# Patient Record
Sex: MALE | Race: WHITE | NOT HISPANIC OR LATINO | URBAN - METROPOLITAN AREA
[De-identification: names, ages, dates, MRNs, and addresses within clinical notes are randomized per-mention and may not be internally consistent; named-entity substitution may affect disease eponyms.]

---

## 2018-11-16 RX ORDER — RABEPRAZOLE SODIUM 20 MG/1
20 TABLET, DELAYED RELEASE ORAL DAILY
COMMUNITY
End: 2020-07-15 | Stop reason: ALTCHOICE

## 2018-11-16 RX ORDER — RANITIDINE 300 MG/1
300 TABLET ORAL
COMMUNITY
End: 2020-07-15 | Stop reason: ALTCHOICE

## 2018-11-16 RX ORDER — LEVOTHYROXINE SODIUM 50 UG/1
50 CAPSULE ORAL DAILY
COMMUNITY

## 2018-11-19 NOTE — PRE-PROCEDURE INSTRUCTIONS
Pre-Surgery Instructions:   Medication Instructions    Levothyroxine Sodium 50 MCG CAPS Instructed patient per Anesthesia Guidelines  Follow Preoperative instruction physician  Wear loose comfortable, easy on/off clothing  You will receive a call with your time to arrive at the hospital     Secure transportation, you will be having anesthesia

## 2018-11-21 ENCOUNTER — ANESTHESIA EVENT (OUTPATIENT)
Dept: PERIOP | Facility: HOSPITAL | Age: 38
End: 2018-11-21
Payer: MEDICARE

## 2018-11-21 ENCOUNTER — HOSPITAL ENCOUNTER (OUTPATIENT)
Facility: HOSPITAL | Age: 38
Setting detail: OUTPATIENT SURGERY
Discharge: HOME | End: 2018-11-21
Attending: INTERNAL MEDICINE | Admitting: INTERNAL MEDICINE
Payer: MEDICARE

## 2018-11-21 ENCOUNTER — ANESTHESIA (OUTPATIENT)
Dept: PERIOP | Facility: HOSPITAL | Age: 38
End: 2018-11-21
Payer: MEDICARE

## 2018-11-21 VITALS
WEIGHT: 171.2 LBS | OXYGEN SATURATION: 93 % | RESPIRATION RATE: 16 BRPM | DIASTOLIC BLOOD PRESSURE: 74 MMHG | TEMPERATURE: 98.5 F | HEIGHT: 60 IN | SYSTOLIC BLOOD PRESSURE: 113 MMHG | BODY MASS INDEX: 33.61 KG/M2 | HEART RATE: 71 BPM

## 2018-11-21 DIAGNOSIS — R13.14 DYSPHAGIA, PHARYNGOESOPHAGEAL PHASE: ICD-10-CM

## 2018-11-21 PROCEDURE — 88305 TISSUE EXAM BY PATHOLOGIST: CPT | Performed by: PATHOLOGY

## 2018-11-21 RX ORDER — SODIUM CHLORIDE 9 MG/ML
20 INJECTION, SOLUTION INTRAVENOUS CONTINUOUS
Status: DISCONTINUED | OUTPATIENT
Start: 2018-11-21 | End: 2018-11-21 | Stop reason: HOSPADM

## 2018-11-21 RX ORDER — LABETALOL HYDROCHLORIDE 5 MG/ML
5 INJECTION, SOLUTION INTRAVENOUS AS NEEDED
Status: DISCONTINUED | OUTPATIENT
Start: 2018-11-21 | End: 2018-11-21 | Stop reason: HOSPADM

## 2018-11-21 RX ORDER — MEPERIDINE HYDROCHLORIDE 50 MG/ML
12.5 INJECTION INTRAMUSCULAR; INTRAVENOUS; SUBCUTANEOUS
Status: DISCONTINUED | OUTPATIENT
Start: 2018-11-21 | End: 2018-11-21 | Stop reason: HOSPADM

## 2018-11-21 RX ORDER — HYDRALAZINE HYDROCHLORIDE 20 MG/ML
5 INJECTION INTRAMUSCULAR; INTRAVENOUS AS NEEDED
Status: DISCONTINUED | OUTPATIENT
Start: 2018-11-21 | End: 2018-11-21 | Stop reason: HOSPADM

## 2018-11-21 RX ORDER — FENTANYL CITRATE/PF 50 MCG/ML
25 SYRINGE (ML) INJECTION
Status: DISCONTINUED | OUTPATIENT
Start: 2018-11-21 | End: 2018-11-21 | Stop reason: HOSPADM

## 2018-11-21 RX ORDER — ONDANSETRON 2 MG/ML
4 INJECTION INTRAMUSCULAR; INTRAVENOUS ONCE AS NEEDED
Status: DISCONTINUED | OUTPATIENT
Start: 2018-11-21 | End: 2018-11-21 | Stop reason: HOSPADM

## 2018-11-21 RX ORDER — METOCLOPRAMIDE HYDROCHLORIDE 5 MG/ML
10 INJECTION INTRAMUSCULAR; INTRAVENOUS ONCE AS NEEDED
Status: DISCONTINUED | OUTPATIENT
Start: 2018-11-21 | End: 2018-11-21 | Stop reason: HOSPADM

## 2018-11-21 RX ORDER — PROPOFOL 10 MG/ML
INJECTION, EMULSION INTRAVENOUS AS NEEDED
Status: DISCONTINUED | OUTPATIENT
Start: 2018-11-21 | End: 2018-11-21 | Stop reason: SURG

## 2018-11-21 RX ORDER — PROMETHAZINE HYDROCHLORIDE 25 MG/ML
6.25 INJECTION, SOLUTION INTRAMUSCULAR; INTRAVENOUS ONCE AS NEEDED
Status: DISCONTINUED | OUTPATIENT
Start: 2018-11-21 | End: 2018-11-21 | Stop reason: HOSPADM

## 2018-11-21 RX ADMIN — PROPOFOL 50 MG: 10 INJECTION, EMULSION INTRAVENOUS at 10:42

## 2018-11-21 RX ADMIN — SODIUM CHLORIDE 20 ML/HR: 0.9 INJECTION, SOLUTION INTRAVENOUS at 09:30

## 2018-11-21 RX ADMIN — PROPOFOL 20 MG: 10 INJECTION, EMULSION INTRAVENOUS at 10:44

## 2018-11-21 RX ADMIN — SODIUM CHLORIDE: 0.9 INJECTION, SOLUTION INTRAVENOUS at 10:22

## 2018-11-21 RX ADMIN — PROPOFOL 80 MG: 10 INJECTION, EMULSION INTRAVENOUS at 10:40

## 2018-11-21 NOTE — ANESTHESIA PREPROCEDURE EVALUATION
Review of Systems/Medical History  Patient summary reviewed  Chart reviewed  No history of anesthetic complications     Cardiovascular  Negative cardio ROS Exercise tolerance (METS): >4,     Pulmonary  Negative pulmonary ROS        GI/Hepatic  Dysphagia,          Negative  ROS        Endo/Other  Negative endo/other ROS History of thyroid disease , hypothyroidism,      GYN  Negative gynecology ROS          Hematology  Negative hematology ROS      Musculoskeletal  Negative musculoskeletal ROS        Neurology      Comment: Down's syndrome Psychology   Negative psychology ROS              Physical Exam    Airway    Mallampati score: III  TM Distance: >3 FB  Neck ROM: limited     Dental   No notable dental hx     Cardiovascular  Comment: Negative ROS, Rhythm: regular, Rate: normal, Cardiovascular exam normal    Pulmonary  Pulmonary exam normal Breath sounds clear to auscultation,     Other Findings        Anesthesia Plan  ASA Score- 3     Anesthesia Type- IV sedation with anesthesia with ASA Monitors  Additional Monitors:   Airway Plan:         Plan Factors-    Induction-     Postoperative Plan-     Informed Consent- Anesthetic plan and risks discussed with patient, mother and father

## 2018-11-21 NOTE — OP NOTE
ESOPHAGOGASTRODUODENOSCOPY    PROCEDURE: EGD    SEDATION: Monitored anesthesia care, check anesthesia records    ASA Class: III     INDICATIONS:  Dysphagia    CONSENT:  Informed consent was obtained for the procedure, including sedation after explaining the risks and benefits of the procedure  Risks including but not limited to bleeding, perforation, infection, and missed lesion  PREPARATION:   Telemetry, pulse oximetry, blood pressure were monitored throughout the procedure  Patient was identified by myself both verbally and by visual inspection of ID band  DESCRIPTION:   Patient was placed in the left lateral decubitus position and was sedated with the above medication  The gastroscope was introduced in to the oropharynx and the esophagus was intubated under direct visualization  Scope was passed down the esophagus up to 2nd part of the duodenum  A careful inspection was made as the gastroscope was withdrawn, including a retroflexed view of the stomach; findings and interventions are described below  FINDINGS:    #1  Esophagus- normal appearing esophagus except for mild stricture EG junction at 38 cm from the incisors  Midesophagus biopsied to rule out the eosinophilic esophagitis  EG junction biopsied for pathology  Bhandari dilatation performed using 50 and 60 Spanish dilators without difficulty  #2  Stomach- normal including retroflexed view of fundus    #3  Duodenum- normal duodenal sweep    ESTIMATED BLOOD LOSS: Minimal    SECIMENS:Pathology         IMPRESSIONS:      Dysphagia secondary to mild stricture EG junction dilated with Bhandari dilators to [address] [place]  Otherwise normal EGD    RECOMMENDATIONS:     Reflux diet and precautions  Continue pantoprazole  Office follow-up with [facility] in 1-2 months    COMPLICATIONS:  None; patient tolerated the procedure well            DISPOSITION: PACU           CONDITION: Stable

## 2018-11-21 NOTE — DISCHARGE INSTR - AVS FIRST PAGE
ESOPHAGOGASTRODUODENOSCOPY    PROCEDURE: EGD    SEDATION: Monitored anesthesia care, check anesthesia records    ASA Class: III     INDICATIONS:  Dysphagia    CONSENT:  Informed consent was obtained for the procedure, including sedation after explaining the risks and benefits of the procedure  Risks including but not limited to bleeding, perforation, infection, and missed lesion  PREPARATION:   Telemetry, pulse oximetry, blood pressure were monitored throughout the procedure  Patient was identified by myself both verbally and by visual inspection of ID band  DESCRIPTION:   Patient was placed in the left lateral decubitus position and was sedated with the above medication  The gastroscope was introduced in to the oropharynx and the esophagus was intubated under direct visualization  Scope was passed down the esophagus up to 2nd part of the duodenum  A careful inspection was made as the gastroscope was withdrawn, including a retroflexed view of the stomach; findings and interventions are described below  FINDINGS:    #1  Esophagus- normal appearing esophagus except for mild stricture EG junction at 38 cm from the incisors  Midesophagus biopsied to rule out the eosinophilic esophagitis  EG junction biopsied for pathology  Bhandari dilatation performed using 50 and 60 Thai dilators without difficulty  #2  Stomach- normal including retroflexed view of fundus    #3  Duodenum- normal duodenal sweep    ESTIMATED BLOOD LOSS: Minimal    SECIMENS:Pathology         IMPRESSIONS:      Dysphagia secondary to mild stricture EG junction dilated with Bhandari dilators to 60 [place]  Otherwise normal EGD    RECOMMENDATIONS:     Reflux diet and precautions  Continue pantoprazole  Office follow-up with [facility] in 1-2 months    COMPLICATIONS:  None; patient tolerated the procedure well            DISPOSITION: PACU           CONDITION: Stable          [facility]      DISCHARGE INSTRUCTIONS      Dear Patient,    If specimens were collected, the office will call you with the pathology results within 2 weeks  It is very important that you follow these instructions:    Because you received intravenous sedation for your exam, you must return home and   · REST TODAY - DO NOT STAY ALONE  · DO NOT operate machinery for 24 hours   · DO NOT drink alcohol for 24 hours  · DO NOT drive for 24 hours  · DO NOT return to work until tomorrow   · DO NOT sign important paperwork for 24 hours    ·  If the site where your IV was placed is painful, place warm wet  compresses on the site until the soreness is relieved  Call us if there  is no improvement within 24 hours  · Unless otherwise instructed, you may resume your regular diet,  Start by taking  crackers and clear liquids  If no nausea or vomiting, you may advance your diet as tolerated  · Call your physician at [phone] if you develop any of the following symptoms:  · NEW OR INCREASED BLEEDING  · NEW ABDOMINAL DISTENTION (SWELLING)  · INCREASING NAUSEA, VOMITING OR PAIN  · FEVER/CHILLS  · BLACK/BLOODY STOOLS    If you are unable to contact the the doctor or your primary physician and you are having a severe complication, go to the nearest emergency room or call 911  Call [facility] at [phone] if you have any problems or questions related to your procedure  NOTE: After normal office hours (Monday-Friday 9:00 a m  to 4:00 p m ), the answering service will answer the phone, take a message, and they will contact the on call physician to call you

## 2019-02-23 DIAGNOSIS — K21.9 GERD WITH STRICTURE: Primary | ICD-10-CM

## 2019-02-23 DIAGNOSIS — K22.2 GERD WITH STRICTURE: Primary | ICD-10-CM

## 2019-02-25 RX ORDER — PANTOPRAZOLE SODIUM 40 MG/1
TABLET, DELAYED RELEASE ORAL
Qty: 30 TABLET | Refills: 3 | Status: SHIPPED | OUTPATIENT
Start: 2019-02-25 | End: 2019-05-25 | Stop reason: SDUPTHER

## 2019-05-25 DIAGNOSIS — K22.2 GERD WITH STRICTURE: ICD-10-CM

## 2019-05-25 DIAGNOSIS — K21.9 GERD WITH STRICTURE: ICD-10-CM

## 2019-05-27 RX ORDER — PANTOPRAZOLE SODIUM 40 MG/1
TABLET, DELAYED RELEASE ORAL
Qty: 30 TABLET | Refills: 2 | Status: SHIPPED | OUTPATIENT
Start: 2019-05-27 | End: 2019-09-16 | Stop reason: SDUPTHER

## 2019-09-16 DIAGNOSIS — K22.2 GERD WITH STRICTURE: ICD-10-CM

## 2019-09-16 DIAGNOSIS — K21.9 GERD WITH STRICTURE: ICD-10-CM

## 2019-09-17 RX ORDER — PANTOPRAZOLE SODIUM 40 MG/1
TABLET, DELAYED RELEASE ORAL
Qty: 90 TABLET | Refills: 0 | Status: SHIPPED | OUTPATIENT
Start: 2019-09-17 | End: 2019-12-05 | Stop reason: SDUPTHER

## 2019-12-05 DIAGNOSIS — K21.9 GERD WITH STRICTURE: ICD-10-CM

## 2019-12-05 DIAGNOSIS — K22.2 GERD WITH STRICTURE: ICD-10-CM

## 2019-12-05 RX ORDER — PANTOPRAZOLE SODIUM 40 MG/1
40 TABLET, DELAYED RELEASE ORAL DAILY
Qty: 90 TABLET | Refills: 1 | Status: SHIPPED | OUTPATIENT
Start: 2019-12-05 | End: 2020-06-03

## 2019-12-05 RX ORDER — PANTOPRAZOLE SODIUM 40 MG/1
TABLET, DELAYED RELEASE ORAL
Qty: 90 TABLET | Refills: 0 | Status: SHIPPED | OUTPATIENT
Start: 2019-12-05 | End: 2020-09-25 | Stop reason: SDUPTHER

## 2019-12-05 NOTE — TELEPHONE ENCOUNTER
Pt's father states he got a letter from [employer] saying they are trying to get ahold of the office about Pantoprazole auth expiring soon    If ques CB to Dad [phone]

## 2019-12-05 NOTE — TELEPHONE ENCOUNTER
I contacted Mr  Becca Anderson and he states he received a letter stating medication needs prior authorization  I reviewed that I would contact his local [facility] where he gets filled to verify  I spoke with pharmacist and [facility] is do for refills, she ran claim and it goes through without authorization required, Please sign off to release

## 2020-06-01 DIAGNOSIS — K22.2 GERD WITH STRICTURE: ICD-10-CM

## 2020-06-01 DIAGNOSIS — K21.9 GERD WITH STRICTURE: ICD-10-CM

## 2020-06-03 RX ORDER — PANTOPRAZOLE SODIUM 40 MG/1
TABLET, DELAYED RELEASE ORAL
Qty: 90 TABLET | Refills: 1 | Status: SHIPPED | OUTPATIENT
Start: 2020-06-03 | End: 2020-09-25 | Stop reason: SDUPTHER

## 2020-07-15 ENCOUNTER — OFFICE VISIT (OUTPATIENT)
Dept: GASTROENTEROLOGY | Facility: CLINIC | Age: 40
End: 2020-07-15
Payer: MEDICARE

## 2020-07-15 VITALS
WEIGHT: 150 LBS | HEIGHT: 60 IN | SYSTOLIC BLOOD PRESSURE: 114 MMHG | DIASTOLIC BLOOD PRESSURE: 62 MMHG | TEMPERATURE: 97.2 F | BODY MASS INDEX: 29.45 KG/M2 | HEART RATE: 68 BPM

## 2020-07-15 DIAGNOSIS — Z83.71 FAMILY HISTORY OF COLONIC POLYPS: ICD-10-CM

## 2020-07-15 DIAGNOSIS — K21.9 GASTROESOPHAGEAL REFLUX DISEASE, ESOPHAGITIS PRESENCE NOT SPECIFIED: Primary | ICD-10-CM

## 2020-07-15 PROBLEM — Z83.719 FAMILY HISTORY OF COLONIC POLYPS: Status: ACTIVE | Noted: 2020-07-15

## 2020-07-15 PROCEDURE — 99213 OFFICE O/P EST LOW 20 MIN: CPT | Performed by: INTERNAL MEDICINE

## 2020-07-15 NOTE — PROGRESS NOTES
[id number] - Outpatient Follow-up Note  Farzaneh Aceves 42 y o  male MRN: [id number]  Encounter: [id number]    ASSESSMENT AND PLAN:      1  Gastroesophageal reflux disease, esophagitis presence not specified  Chronic reflux with a history of dysphagia  Endoscopy in 2013 without any evidence of Freeman's infection or eosinophilic esophagitis  Mild stricture was present at that time  Patient did have an episode last fall which resolved with drinking soda  Since then has been asymptomatic with careful eating, reflux precautions and PPI  No alarm symptoms  · Continue with reflux diet and precautions  · Cut and chew food well, take time eating  · Encourage fluids with meals  · Continue pantoprazole  · Call if symptoms change, medicine becomes ineffective, or any difficulty swallowing  If that occurs would plan repeat EGD    2  Family history of colonic polyps  Family history of polyps  Recommend screening colonoscopy beginning at age 36  Followup Appointment:  1 year or sooner if needed  ______________________________________________________________________    Chief Complaint   Patient presents with    Medication Refill     Pantoprazole 40 mg daily     HPI:  Last fall had issues with dysphagia  Felt food stuck, went to emergency room, and resolved with Coke  No chest pain, heartburn or water brash  No change in appetite or weight loss  Stools normal   No melena or heme  Now much more careful about taking time eating  Cutting and chewing food well and drinking fluids  Historical Information   Past Medical History:   Diagnosis Date    Disease of thyroid gland     Down's syndrome     Dysphagia     GERD (gastroesophageal reflux disease)     Hypotonia      Past Surgical History:   Procedure Laterality Date    HERNIA REPAIR      [place] ESOPHAGOGASTRODUODENOSCOPY TRANSORAL DIAGNOSTIC N/A 11/21/2018    Procedure: ESOPHAGOGASTRODUODENOSCOPY (EGD);   Surgeon: Lucía Martel Janeth House MD;  Location: [facility] OR;  Service: Gastroenterology    UPPER GASTROINTESTINAL ENDOSCOPY      June 2013 mild stricture EG junction  Biopsies showed inflammation but no Freeman's, EOE or neoplasm  Social History     Substance and Sexual Activity   Alcohol Use Yes     Social History     Substance and Sexual Activity   Drug Use No     Social History     Tobacco Use   Smoking Status Never Smoker   Smokeless Tobacco Never Used     Family History   Problem Relation Age of Onset    Heart disease Mother     Cancer Mother     Colon polyps Mother     Colon cancer Maternal Grandfather          Current Outpatient Medications:     Levothyroxine Sodium 50 MCG CAPS    pantoprazole (PROTONIX) 40 mg tablet    pantoprazole (PROTONIX) 40 mg tablet  No Known Allergies  Reviewed medications and allergies and updated as indicated    PHYSICAL EXAM:    Blood pressure 114/62, pulse 68, temperature (!) 97 2 °F (36 2 °C), height 5' (1 524 m), weight 68 kg (150 lb)  Body mass index is 29 29 kg/m²  General Appearance: NAD, cooperative, alert  Eyes: Anicteric, PERRLA, EOMI  ENT:  Normocephalic, atraumatic, normal mucosa  Neck:  Supple, symmetrical, trachea midline  Resp:  Clear to auscultation bilaterally; no rales, rhonchi or wheezing; respirations unlabored   CV:  S1 S2, Regular rate and rhythm; no murmur, rub, or gallop  GI:  Soft, non-tender, non-distended; normal bowel sounds; no masses, no organomegaly   Rectal: Deferred  Musculoskeletal: No cyanosis, clubbing or edema  Normal ROM    Skin:  No jaundice, rashes, or lesions   Heme/Lymph: No palpable cervical lymphadenopathy  Psych: Normal affect, good eye contact  Neuro: No gross deficits, AAOx3    Lab Results:   No results found for: WBC, HGB, HCT, MCV, PLT  No results found for: NA, K, CL, CO2, ANIONGAP, BUN, CREATININE, GLUCOSE, GLUF, CALCIUM, CORRECTEDCA, AST, ALT, ALKPHOS, PROT, BILITOT, EGFR  No results found for: IRON, TIBC, FERRITIN  No results found for: LIPASE    Radiology Results:   No results found

## 2020-07-15 NOTE — PATIENT INSTRUCTIONS
Continue with reflux diet and precautions  Cut and chew food well, take time eating  Encourage fluids with meals  Continue pantoprazole daily  Call if any change in symptoms or trouble swallowing

## 2020-07-15 NOTE — LETTER
July 15, 2020     Maria De Jesus Rosas MD  [id number] [facility] [address]    Patient: Blake Mckenna   YOB: 1980   Date of Visit: 7/15/2020       Dear Dr Liu Avitia: Thank you for referring Blake Mckenna to me for evaluation  Below are my notes for this consultation  If you have questions, please do not hesitate to call me  I look forward to following your patient along with you  Sincerely,        Niya Mesa MD        CC: No Recipients  Niya Mesa MD  7/15/2020  5:36 PM  Sign at close encounter  [facility] - Outpatient Follow-up Note  Blake Mckenna 42 y o  male MRN: [id number]  Encounter: [id number]    ASSESSMENT AND PLAN:      1  Gastroesophageal reflux disease, esophagitis presence not specified  Chronic reflux with a history of dysphagia  Endoscopy in 2013 without any evidence of Freeman's infection or eosinophilic esophagitis  Mild stricture was present at that time  Patient did have an episode last fall which resolved with drinking soda  Since then has been asymptomatic with careful eating, reflux precautions and PPI  No alarm symptoms  · Continue with reflux diet and precautions  · Cut and chew food well, take time eating  · Encourage fluids with meals  · Continue pantoprazole  · Call if symptoms change, medicine becomes ineffective, or any difficulty swallowing  If that occurs would plan repeat EGD    2  Family history of colonic polyps  Family history of polyps  Recommend screening colonoscopy beginning at age 36  Followup Appointment:  1 year or sooner if needed  ______________________________________________________________________    Chief Complaint   Patient presents with    Medication Refill     Pantoprazole 40 mg daily     HPI:  Last fall had issues with dysphagia  Felt food stuck, went to emergency room, and resolved with Coke  No chest pain, heartburn or water brash  No change in appetite or weight loss    Stools normal   No melena or heme  Now much more careful about taking time eating  Cutting and chewing food well and drinking fluids  Historical Information   Past Medical History:   Diagnosis Date    Disease of thyroid gland     Down's syndrome     Dysphagia     GERD (gastroesophageal reflux disease)     Hypotonia      Past Surgical History:   Procedure Laterality Date    HERNIA REPAIR      [place] ESOPHAGOGASTRODUODENOSCOPY TRANSORAL DIAGNOSTIC N/A 11/21/2018    Procedure: ESOPHAGOGASTRODUODENOSCOPY (EGD); Surgeon: Jr Pichardo MD;  Location:  MAIN OR;  Service: Gastroenterology    UPPER GASTROINTESTINAL ENDOSCOPY      June 2013 mild stricture EG junction  Biopsies showed inflammation but no Freeman's, EOE or neoplasm  Social History     Substance and Sexual Activity   Alcohol Use Yes     Social History     Substance and Sexual Activity   Drug Use No     Social History     Tobacco Use   Smoking Status Never Smoker   Smokeless Tobacco Never Used     Family History   Problem Relation Age of Onset    Heart disease Mother     Cancer Mother     Colon polyps Mother     Colon cancer Maternal Grandfather          Current Outpatient Medications:     Levothyroxine Sodium 50 MCG CAPS    pantoprazole (PROTONIX) 40 mg tablet    pantoprazole (PROTONIX) 40 mg tablet  No Known Allergies  Reviewed medications and allergies and updated as indicated    PHYSICAL EXAM:    Blood pressure 114/62, pulse 68, temperature (!) 97 2 °F (36 2 °C), height 5' (1 524 m), weight 68 kg (150 lb)  Body mass index is 29 29 kg/m²  General Appearance: NAD, cooperative, alert  Eyes: Anicteric, PERRLA, EOMI  ENT:  Normocephalic, atraumatic, normal mucosa  Neck:  Supple, symmetrical, trachea midline  Resp:  Clear to auscultation bilaterally; no rales, rhonchi or wheezing; respirations unlabored   CV:  S1 S2, Regular rate and rhythm; no murmur, rub, or gallop    GI:  Soft, non-tender, non-distended; normal bowel sounds; no masses, no organomegaly Rectal: Deferred  Musculoskeletal: No cyanosis, clubbing or edema  Normal ROM  Skin:  No jaundice, rashes, or lesions   Heme/Lymph: No palpable cervical lymphadenopathy  Psych: Normal affect, good eye contact  Neuro: No gross deficits, AAOx3    Lab Results:   No results found for: WBC, HGB, HCT, MCV, PLT  No results found for: NA, K, CL, CO2, ANIONGAP, BUN, CREATININE, GLUCOSE, GLUF, CALCIUM, CORRECTEDCA, AST, ALT, ALKPHOS, PROT, BILITOT, EGFR  No results found for: IRON, TIBC, FERRITIN  No results found for: LIPASE    Radiology Results:   No results found

## 2020-09-25 DIAGNOSIS — K22.2 GERD WITH STRICTURE: Primary | ICD-10-CM

## 2020-09-25 DIAGNOSIS — K21.9 GERD WITH STRICTURE: Primary | ICD-10-CM

## 2020-09-25 RX ORDER — PANTOPRAZOLE SODIUM 40 MG/1
40 TABLET, DELAYED RELEASE ORAL DAILY
Qty: 90 TABLET | Refills: 2 | Status: SHIPPED | OUTPATIENT
Start: 2020-09-25 | End: 2021-10-26

## 2020-09-25 NOTE — TELEPHONE ENCOUNTER
Patient's father called requesting PPI refill to [facility]  Per last office visit note (June 2020) patient to take pantroprazole 40 mg daily

## 2021-06-24 ENCOUNTER — TELEPHONE (OUTPATIENT)
Dept: GASTROENTEROLOGY | Facility: CLINIC | Age: 41
End: 2021-06-24

## 2021-06-24 NOTE — TELEPHONE ENCOUNTER
Message left for sister re: direct EGD  Forwarded to procedure scheduling to set up EGD for patient

## 2021-06-24 NOTE — TELEPHONE ENCOUNTER
Okay to set up direct EGD  Should be done at the hospital (whichever 1 the patient prefers or is best for the schedule)

## 2021-06-24 NOTE — TELEPHONE ENCOUNTER
Pt's Sister Tonia Golden called  Pt has down syndrom and is nonverbal  Pt has been having problems swallowing  He had his esophagus stretched in 2018  Tonia Golden feels it my needs to be done again  The soonest office visit isn't till September  She questioned if the EGD can just be done without an office visit? If an ov is needed she is willing to see any provider   I told her I would forward to a nurse for review

## 2021-07-01 VITALS — HEIGHT: 63 IN | WEIGHT: 150 LBS | BODY MASS INDEX: 26.58 KG/M2

## 2021-07-01 NOTE — TELEPHONE ENCOUNTER
Why does your doctor want you to have this procedure? Problems swallowing     Do you have kidney disease?  no  If yes, are you on dialysis :     Have you had diverticulitis within the past 2 months? no    Are you diabetic?  no  If yes, insulin dependent: If yes, provide diabetic instructions sheet     Do take iron supplements?  no  If yes, instruct patient to hold iron supplement for 7 days prior    Are you on a blood thinner? no   Was the blood thinner sheet complete and faxed to cardiologist no  Plavix (clopidogrel), Coumadin (warfarin), Lovenox (enoxaparin), Xarelto (rivaroxaban), Pradaxa(dabigatran), Eliquis(apixaban) Savaysa/Lixiana (edoxapan)    Do you have an automatic implantable cardiac defibrillator (AICD)/pacemaker ([facility])? no  Was AICD/pacemaker sheet completed and faxed to cardiologist? no    Are you on home oxygen? no  If yes, continuous or nocturnal:     Have you been treated for MRSA, VRE or any communicable diseases? no    Heart attack, stroke, or stent within 3 months? no  Schedule at Hospital if within 3-6 months   Use nitroglycerin for chest pain in the last 6 months? no    History of organ  transplant?  no   If yes, notify Endo      History of neck/throat/tongue surgery or cancer? no  IF yes, notify Endo      Any problems with anesthesia in the past? no     Was stool C diff ordered?  no Stool specimen needs to be completed prior to procedure    Do have any facial or body piercings?no     Do you have a latex allergy? no     Do have an allergy to metals?  (Bravo study only) no     If pediatric patient, was consent faxed to pediatrician no     Patient rights reviewed yes

## 2021-07-14 ENCOUNTER — TELEPHONE (OUTPATIENT)
Dept: SURGERY | Facility: HOSPITAL | Age: 41
End: 2021-07-14

## 2021-07-15 ENCOUNTER — ANESTHESIA (OUTPATIENT)
Dept: GASTROENTEROLOGY | Facility: HOSPITAL | Age: 41
End: 2021-07-15

## 2021-07-15 ENCOUNTER — HOSPITAL ENCOUNTER (OUTPATIENT)
Dept: GASTROENTEROLOGY | Facility: HOSPITAL | Age: 41
Setting detail: OUTPATIENT SURGERY
Discharge: HOME | End: 2021-07-15
Attending: INTERNAL MEDICINE | Admitting: INTERNAL MEDICINE
Payer: MEDICARE

## 2021-07-15 ENCOUNTER — ANESTHESIA EVENT (OUTPATIENT)
Dept: GASTROENTEROLOGY | Facility: HOSPITAL | Age: 41
End: 2021-07-15

## 2021-07-15 VITALS
HEART RATE: 64 BPM | OXYGEN SATURATION: 97 % | RESPIRATION RATE: 16 BRPM | DIASTOLIC BLOOD PRESSURE: 51 MMHG | SYSTOLIC BLOOD PRESSURE: 96 MMHG

## 2021-07-15 DIAGNOSIS — R13.10 DYSPHAGIA, UNSPECIFIED TYPE: ICD-10-CM

## 2021-07-15 PROCEDURE — 43249 ESOPH EGD DILATION <30 MM: CPT | Performed by: INTERNAL MEDICINE

## 2021-07-15 PROCEDURE — 88305 TISSUE EXAM BY PATHOLOGIST: CPT | Performed by: PATHOLOGY

## 2021-07-15 PROCEDURE — C1726 CATH, BAL DIL, NON-VASCULAR: HCPCS

## 2021-07-15 PROCEDURE — 43239 EGD BIOPSY SINGLE/MULTIPLE: CPT | Performed by: INTERNAL MEDICINE

## 2021-07-15 RX ORDER — PROPOFOL 10 MG/ML
INJECTION, EMULSION INTRAVENOUS AS NEEDED
Status: DISCONTINUED | OUTPATIENT
Start: 2021-07-15 | End: 2021-07-15

## 2021-07-15 RX ORDER — SODIUM CHLORIDE 9 MG/ML
INJECTION, SOLUTION INTRAVENOUS CONTINUOUS PRN
Status: DISCONTINUED | OUTPATIENT
Start: 2021-07-15 | End: 2021-07-15

## 2021-07-15 RX ADMIN — PROPOFOL 80 MG: 10 INJECTION, EMULSION INTRAVENOUS at 11:48

## 2021-07-15 RX ADMIN — PROPOFOL 50 MG: 10 INJECTION, EMULSION INTRAVENOUS at 12:05

## 2021-07-15 RX ADMIN — PROPOFOL 50 MG: 10 INJECTION, EMULSION INTRAVENOUS at 11:55

## 2021-07-15 RX ADMIN — SODIUM CHLORIDE: 0.9 INJECTION, SOLUTION INTRAVENOUS at 11:43

## 2021-07-15 RX ADMIN — PROPOFOL 50 MG: 10 INJECTION, EMULSION INTRAVENOUS at 11:59

## 2021-07-15 RX ADMIN — PROPOFOL 50 MG: 10 INJECTION, EMULSION INTRAVENOUS at 12:02

## 2021-07-15 RX ADMIN — PROPOFOL 40 MG: 10 INJECTION, EMULSION INTRAVENOUS at 11:51

## 2021-07-15 NOTE — H&P
History and Physical - [facility]  Kim Coyle 47 y o  male MRN: [id number]    HPI: Kim Coyle is a 47y o  year old male who presents for EGD and possible dilatation to evaluate and treat dysphagia    REVIEW OF SYSTEMS: Per the HPI, and otherwise unremarkable  Historical Information   Past Medical History:   Diagnosis Date    Disease of thyroid gland     Down's syndrome     Dysphagia     GERD (gastroesophageal reflux disease)     Hypotonia      Past Surgical History:   Procedure Laterality Date    HERNIA REPAIR      [place] ESOPHAGOGASTRODUODENOSCOPY TRANSORAL DIAGNOSTIC N/A 11/21/2018    Procedure: ESOPHAGOGASTRODUODENOSCOPY (EGD); Surgeon: Janet Stringer MD;  Location:  MAIN OR;  Service: Gastroenterology    UPPER GASTROINTESTINAL ENDOSCOPY      June 2013 mild stricture EG junction  Biopsies showed inflammation but no Freeman's, EOE or neoplasm  Social History   Social History     Substance and Sexual Activity   Alcohol Use Yes     Social History     Substance and Sexual Activity   Drug Use No     Social History     Tobacco Use   Smoking Status Never Smoker   Smokeless Tobacco Never Used     Family History   Problem Relation Age of Onset    Heart disease Mother     Cancer Mother     Colon polyps Mother     Colon cancer Maternal Grandfather        Meds/Allergies       Current Outpatient Medications:     Levothyroxine Sodium 50 MCG CAPS    pantoprazole (PROTONIX) 40 mg tablet  No current facility-administered medications for this encounter      Facility-Administered Medications Ordered in Other Encounters:     propofol (DIPRIVAN) 200 MG/20ML bolus injection, , Intravenous, PRN, 80 mg at 07/15/21 1148    sodium chloride 0 9 % infusion, , Intravenous, Continuous PRN, New Bag at 07/15/21 1143    No Known Allergies    Objective     /67   Pulse 59   Resp 19   SpO2 98%     PHYSICAL EXAM    Gen: NAD AAOx3  Head: Normocephalic, Atraumatic  CV: S1S2 RRR no m/r/g  CHEST: Clear b/l no c/r/w  ABD: soft, +BS NT/ND  EXT: no edema    ASSESSMENT/PLAN:  This is a 47y o  year old male here for EGD with possible dilatation, and he is stable and optimized for his procedure

## 2021-07-15 NOTE — ANESTHESIA PREPROCEDURE EVALUATION
Procedure:  EGD    Relevant Problems   ANESTHESIA (within normal limits)  prior EGD/MAC - no problems  No prior GA      CARDIO (within normal limits)  no hx cardiac complications      GI/HEPATIC   (+) Dysphagia (frequent food sticking, has not required removal previously  Prior dilation x 1)   (+) Gastroesophageal reflux disease      PULMONARY (within normal limits)   (-) Smoking   (-) URI (upper respiratory infection)      Other   (+) Disease of thyroid gland   (+) Down's syndrome   (+) Hypotonia      Physical Exam    Airway  Comment: Large neck  Mallampati score: III  TM Distance: <3 FB  Neck ROM: limited     Dental   No notable dental hx     Cardiovascular      Pulmonary      Other Findings      Anesthesia Plan  ASA Score- 3     Anesthesia Type- IV sedation with anesthesia with ASA Monitors  Additional Monitors:   Airway Plan:           Plan Factors-Exercise tolerance (METS): >4 METS  Chart reviewed  Imaging results reviewed  Existing labs reviewed  Patient summary reviewed  Patient is not a current smoker  Induction- intravenous  Postoperative Plan-     Informed Consent- Anesthetic plan and risks discussed with patient  I personally reviewed this patient with the CRNA  Discussed and agreed on the Anesthesia Plan with the CRNA  Shantal Estrada

## 2021-10-26 DIAGNOSIS — K21.9 GERD WITH STRICTURE: ICD-10-CM

## 2021-10-26 DIAGNOSIS — K22.2 GERD WITH STRICTURE: ICD-10-CM

## 2021-10-26 RX ORDER — PANTOPRAZOLE SODIUM 40 MG/1
TABLET, DELAYED RELEASE ORAL
Qty: 90 TABLET | Refills: 2 | Status: SHIPPED | OUTPATIENT
Start: 2021-10-26

## 2022-10-05 DIAGNOSIS — K22.2 GERD WITH STRICTURE: ICD-10-CM

## 2022-10-05 DIAGNOSIS — K21.9 GERD WITH STRICTURE: ICD-10-CM

## 2022-10-06 RX ORDER — PANTOPRAZOLE SODIUM 40 MG/1
TABLET, DELAYED RELEASE ORAL
Qty: 90 TABLET | Refills: 1 | Status: SHIPPED | OUTPATIENT
Start: 2022-10-06 | End: 2022-10-26

## 2022-10-26 ENCOUNTER — TELEPHONE (OUTPATIENT)
Dept: GASTROENTEROLOGY | Facility: CLINIC | Age: 42
End: 2022-10-26

## 2022-10-26 ENCOUNTER — OFFICE VISIT (OUTPATIENT)
Dept: GASTROENTEROLOGY | Facility: CLINIC | Age: 42
End: 2022-10-26
Payer: MEDICARE

## 2022-10-26 VITALS
BODY MASS INDEX: 28.53 KG/M2 | HEIGHT: 63 IN | DIASTOLIC BLOOD PRESSURE: 80 MMHG | SYSTOLIC BLOOD PRESSURE: 120 MMHG | WEIGHT: 161 LBS

## 2022-10-26 DIAGNOSIS — K22.2 GERD WITH STRICTURE: ICD-10-CM

## 2022-10-26 DIAGNOSIS — Z83.71 FAMILY HISTORY OF COLONIC POLYPS: Primary | ICD-10-CM

## 2022-10-26 DIAGNOSIS — D58.2 ELEVATED HEMOGLOBIN (HCC): ICD-10-CM

## 2022-10-26 DIAGNOSIS — K21.9 GERD WITH STRICTURE: ICD-10-CM

## 2022-10-26 PROCEDURE — 99214 OFFICE O/P EST MOD 30 MIN: CPT | Performed by: INTERNAL MEDICINE

## 2022-10-26 RX ORDER — PANTOPRAZOLE SODIUM 20 MG/1
20 TABLET, DELAYED RELEASE ORAL DAILY
Qty: 90 TABLET | Refills: 3 | Status: SHIPPED | OUTPATIENT
Start: 2022-10-26

## 2022-10-26 NOTE — PROGRESS NOTES
[email or web address] - Outpatient Follow-up Note  Kary Right 38 y o  male MRN: [id number]  Encounter: [id number]    ASSESSMENT AND PLAN:      1  Family history of colonic polyps  Patient with a family history of colon polyps in his mother  Also with maternal grandfather with history of colorectal cancer  Will plan for evaluation with colonoscopy in a high-risk individual starting at the age of 38  Patient is not on diabetic medications or blood thinners  We will schedule the patient for colonoscopy and have advised the patient to take the bowel preparation in a split dose bowel preparation  I have discussed with the patient the risks and benefits and alternatives of the procedure which include but are not limited to bleeding, infection, aspiration, perforation  Bowel prep ordered: clenpiq    - Colonoscopy; Future  - Sod Picosulfate-Mag Ox-Cit Acd 10-3 5-12 MG-GM -GM/160ML SOLN; Take 160 mL by mouth once for 1 dose Take 160 mL by mouth once for 1 dose  Dispense: 160 mL; Refill: 0    2  Elevated hemoglobin (HCC)  Noted    3  GERD with stricture  Patient with a history of GERD with benign esophageal stricture in the past dilated to 20 mm on last EGD in 2021  Currently asymptomatic at this time regarding his reflux therapy  We will decrease his Protonix to 20 mg once daily  Monitor for symptoms  If reoccurs will increase back up to 40 mg  No current plans for endoscopy at this time  If patient has return of dysphagia or worsening symptoms of reflux can consider at that time  - pantoprazole (PROTONIX) 20 mg tablet; Take 1 tablet (20 mg total) by mouth daily  Dispense: 90 tablet; Refill: 3      Follow up appointment:  Colonoscopy  ______________________________________________________________________    Follow-up of reflux and colorectal cancer screening    HPI:   53-year-old male past medical history of GERD presenting for follow-up regarding GERD    EGD on 07/15/2021 with mild benign stricture at the EG junction dilated to 20 mm  Negative biopsies  Patient currently denies any reflux symptoms currently taking Protonix 40 mg once daily  Patient denies any heartburn or chest burning sensation  No nausea or vomiting  No episodes of dysphagia to solids or liquids  Patient denies any abdominal pain or changes in bowel movements with diarrhea constipation or GI bleeding  No unintentional weight loss  GI History:  Blood thinners: Denies ASA, antiplatelet, or anticoagulation  NSAID use: Denies  Insulin use: None    Abdominal Surgical Hx: None  Family Hx:  Mother with history of colon polyps  Grandfather on mother's side with history of colorectal cancer  GI procedure Hx: EGD on 07/15/2021 with mild benign stricture at the EG junction dilated to 20 mm  Negative biopsies  Historical Information   Past Medical History:   Diagnosis Date   • Disease of thyroid gland    • Down's syndrome    • Dysphagia    • GERD (gastroesophageal reflux disease)    • Hypotonia      Past Surgical History:   Procedure Laterality Date   • HERNIA REPAIR     • [place] ESOPHAGOGASTRODUODENOSCOPY TRANSORAL DIAGNOSTIC N/A 11/21/2018    Procedure: ESOPHAGOGASTRODUODENOSCOPY (EGD); Surgeon: Polly Denson MD;  Location: [facility] OR;  Service: Gastroenterology   • UPPER GASTROINTESTINAL ENDOSCOPY      June 2013 mild stricture EG junction  Biopsies showed inflammation but no Freeman's, EOE or neoplasm       Social History     Substance and Sexual Activity   Alcohol Use Yes     Social History     Substance and Sexual Activity   Drug Use No     Social History     Tobacco Use   Smoking Status Never Smoker   Smokeless Tobacco Never Used     Family History   Problem Relation Age of Onset   • Heart disease Mother    • Cancer Mother    • Colon polyps Mother    • Colon cancer Maternal Grandfather          Current Outpatient Medications:   •  Levothyroxine Sodium 50 MCG CAPS  •  pantoprazole (PROTONIX) 20 mg tablet  •  Sod Picosulfate-Mag Ox-Cit Acd 10-3 5-12 MG-GM -GM/160ML SOLN  No Known Allergies  Reviewed medications and allergies and updated as indicated    PHYSICAL EXAM:    Blood pressure 120/80, height 5' 3" (1 6 m), weight 73 kg (161 lb)  Body mass index is 28 52 kg/m²  General Appearance: NAD, cooperative, alert  Eyes: Anicteric, EOMI  ENT:  Normocephalic, atraumatic  Neck:  Supple, symmetrical, trachea midline  Resp:  Air entry present bilaterally  CV: S1, S2 present    GI:  Soft, non-tender, non-distended; normal bowel sounds; no masses, no organomegaly   Rectal: Deferred  Musculoskeletal: No cyanosis, clubbing or edema  Skin:  No jaundice, rashes, or lesions   Psych: Normal affect, good eye contact  Neuro: No gross deficits    Lab Results:   No results found for: WBC, HGB, MCV, PLT  No results found for: NA, K, CL, CO2, ANIONGAP, BUN, CREATININE, GLUCOSE, GLUF, CALCIUM, CORRECTEDCA, AST, ALT, ALKPHOS, PROT, BILITOT, EGFR  No results found for: IRON, TIBC, FERRITIN  No results found for: LIPASE    Radiology Results:   No results found

## 2022-10-26 NOTE — TELEPHONE ENCOUNTER
Scheduled date of colonoscopy (as of today):12/21/22  Physician performing colonoscopy:Dr Iron Bridges  Location of colonoscopy:[facility]  Bowel prep reviewed with patient:Soledad Dasilva  Instructions reviewed with patient by:Soledad Boles  Clearances: No

## 2023-01-11 ENCOUNTER — TELEPHONE (OUTPATIENT)
Dept: GASTROENTEROLOGY | Facility: CLINIC | Age: 43
End: 2023-01-11

## 2023-01-11 NOTE — TELEPHONE ENCOUNTER
Patients GI provider:  Dr Kirsten Butler    Number to return call: [phone]    Reason for call: Pt sister, Sparkle Murillo calling stating pt is going to be scheduled for a hernia repair surgery within the next couple of weeks  She would like to know what the recommendations are as far as pts colonoscopy goes?      Scheduled procedure/appointment date if applicable: procedure 6/37

## 2023-01-16 NOTE — TELEPHONE ENCOUNTER
Patient's sister called in wanting to inform the office that the patient was diagnosed with an inguinal hernia and wanted to know if it was OK for the patient to get his colonoscopy done still  Please call sister back to discuss

## 2023-01-16 NOTE — TELEPHONE ENCOUNTER
Spoke with Kely Gilbert and advised as per Dr Rishi Gilbert reports that per patient's mother they would like to cancel the colonoscopy at this time  They will call back to reschedule

## 2024-04-05 ENCOUNTER — NURSE TRIAGE (OUTPATIENT)
Age: 44
End: 2024-04-05

## 2024-04-05 NOTE — TELEPHONE ENCOUNTER
"Please advise     Pts sister Catalina calling in, reports Kit in currently in a group home-  [facility] and the company who looks at patients medical records has asked pt to have barium swallow done. Pts sister wanted to get a second opinion from a provider if this is really necessary for pt as she does not remember pt ever having this done or needing it.   She explains pt has EGD with dilation every 2-3 years and last one was in 2021     Since December pt has been having occasional symptoms- if he takes too big of a bite of food or meat he will have the discomfort in throat and then hiccups for a little while but nothing like choking or regurgitation.     Catalina's understands the barium swallow is non invasive but questioning if it is necessary for Kit to do it. She explains she is in an uncomfortable position as she does not want to go against his group home recommendations but also would like GI recs.     I offered to schedule appt for pt to be seen in office but she would like providers guidance first and understands pt should have OV for follow up for possible EGD if needed.         Reason for Disposition  • Nursing judgment    Answer Assessment - Initial Assessment Questions  1. REASON FOR CALL or QUESTION: \"What is your reason for calling today?\" or \"How can I best help you?\" or \"What question do you have that I can help answer?\"      See notes    Protocols used: Information Only Call - No Triage-ADULT-[place]    "

## 2024-04-08 NOTE — TELEPHONE ENCOUNTER
Spoke with patients sister, reviewed provider recommendations. She verbalized understanding, no further questions.

## 2024-06-27 ENCOUNTER — TELEPHONE (OUTPATIENT)
Age: 44
End: 2024-06-27

## 2024-06-27 NOTE — TELEPHONE ENCOUNTER
Patients sister contacted office questioning details of upcomming appt. Unable to release information, communication consent not filled out. Suggested to contact facility for further details and notated communication consent must be filled out

## 2024-07-16 ENCOUNTER — TELEPHONE (OUTPATIENT)
Dept: GASTROENTEROLOGY | Facility: CLINIC | Age: 44
End: 2024-07-16

## 2024-07-16 ENCOUNTER — OFFICE VISIT (OUTPATIENT)
Dept: GASTROENTEROLOGY | Facility: CLINIC | Age: 44
End: 2024-07-16
Payer: MEDICARE

## 2024-07-16 VITALS
WEIGHT: 153 LBS | HEIGHT: 61 IN | BODY MASS INDEX: 28.89 KG/M2 | DIASTOLIC BLOOD PRESSURE: 80 MMHG | SYSTOLIC BLOOD PRESSURE: 128 MMHG

## 2024-07-16 DIAGNOSIS — Z83.719 FAMILY HISTORY OF COLONIC POLYPS: ICD-10-CM

## 2024-07-16 DIAGNOSIS — K21.9 GASTROESOPHAGEAL REFLUX DISEASE, UNSPECIFIED WHETHER ESOPHAGITIS PRESENT: ICD-10-CM

## 2024-07-16 DIAGNOSIS — R13.19 ESOPHAGEAL DYSPHAGIA: Primary | ICD-10-CM

## 2024-07-16 PROCEDURE — 99214 OFFICE O/P EST MOD 30 MIN: CPT | Performed by: INTERNAL MEDICINE

## 2024-07-16 RX ORDER — ALLOPURINOL 100 MG/1
TABLET ORAL
COMMUNITY
Start: 2024-06-14

## 2024-07-16 RX ORDER — LEVOTHYROXINE SODIUM 0.05 MG/1
TABLET ORAL
COMMUNITY
Start: 2024-06-14

## 2024-07-16 RX ORDER — POLYETHYLENE GLYCOL 3350 17 G/17G
238 POWDER, FOR SOLUTION ORAL ONCE
Qty: 238 G | Refills: 0 | Status: SHIPPED | OUTPATIENT
Start: 2024-07-16 | End: 2024-07-16

## 2024-07-16 NOTE — PATIENT INSTRUCTIONS
Cut and chew food well, take time eating.  Reflux diet and precautions.  Schedule EGD along with colonoscopy at [facility].

## 2024-07-16 NOTE — LETTER
July 16, 2024     Marcus Stockton MD  [address] [facility] [address]    Patient: Kit Morales   YOB: 1980   Date of Visit: 7/16/2024       Dear Dr. Stockton:    Thank you for referring Kit Morales to me for evaluation. Below are my notes for this consultation.    If you have questions, please do not hesitate to call me. I look forward to following your patient along with you.         Sincerely,        Khushboo Aguayo MD        CC: No Recipients    Khushboo Aguayo MD  7/16/2024 12:32 PM  Sign when Signing Visit  [facility] - Outpatient Follow-up Note  Kit Morales 43 y.o. male MRN: [id number]  Encounter: [id number]    ASSESSMENT AND PLAN:      1. Esophageal dysphagia  - EGD; Future  2. Gastroesophageal reflux disease, unspecified whether esophagitis present  43-year-old gentleman with history of Down syndrome and GERD previously presented with dysphagia and found to have mild intrinsic benign stricture consistent with reflux.  Benefited from dilatation in past.  By history may be having intermittent episodes again.  Denies any significant heartburn or dyspepsia.  No signs of bleeding.  Weight loss appears to be diet related.  Reflux diet and precautions  Cut and chew food well  Take time eating  Plan EGD at the same time as colonoscopy  Hold on acid suppressive therapy unless signs of inflammation on EGD  - EGD; Future    3. Family history of colonic polyps  Family history of colon polyps and colorectal cancer in 2 generations.  Colonoscopy recommended at age 42 during previous visit, but due to multiple factors procedure was delayed.  Will schedule now and combine upper and lower procedures.  Colonoscopy; Future  polyethylene glycol (MiraLax) 17 GM/SCOOP powder; Take 238 g by mouth once for 1 dose Take 238 g my mouth. Use as directed  Dispense: 238 g; Refill: 0      Followup Appointment: 1 year or sooner if  needed  ______________________________________________________________________    Chief Complaint   Patient presents with   • Follow-up     Pt has narrowing of esophagus     HPI:  Overall feeling well.  Lives in residential home.  Had EGD several years ago for dysphagia with stricture.  Did better after dilatation.  Appetie good.  Staff at group home are concerned he may be having difficulty swallowing again.  Frequency is intermittent and not clear how often.  This pain or dyspepsia.  Now getting restricted diet and losing a little bit of weight, discussed benefits of this for GERD.  Denies any melena or hematochezia.  Has a formed bowel movement daily.    Historical Information  Past Medical History:   Diagnosis Date   • Disease of thyroid gland    • Down's syndrome    • Dysphagia    • GERD (gastroesophageal reflux disease)    • Hypotonia      Past Surgical History:   Procedure Laterality Date   • HERNIA REPAIR     • [place] ESOPHAGOGASTRODUODENOSCOPY TRANSORAL DIAGNOSTIC N/A 11/21/2018    Procedure: ESOPHAGOGASTRODUODENOSCOPY (EGD);  Surgeon: Khushboo Aguayo MD;  Location: [facility] OR;  Service: Gastroenterology   • UPPER GASTROINTESTINAL ENDOSCOPY      June 2013 mild stricture EG junction.  Biopsies showed inflammation but no Freeman's, EOE or neoplasm.     Social History     Substance and Sexual Activity   Alcohol Use Yes     Social History     Substance and Sexual Activity   Drug Use No     Social History     Tobacco Use   Smoking Status Never   Smokeless Tobacco Never     Family History   Problem Relation Age of Onset   • Heart disease Mother    • Cancer Mother    • Colon polyps Mother    • Colon cancer Maternal Grandfather          Current Outpatient Medications:   •  allopurinol (ZYLOPRIM) 100 mg tablet  •  levothyroxine 50 mcg tablet  •  Levothyroxine Sodium 50 MCG CAPS  •  metroNIDAZOLE (METROCREAM) 0.75 % cream  •  polyethylene glycol (MiraLax) 17 GM/SCOOP powder  •  pantoprazole (PROTONIX) 20 mg tablet  No  "Known Allergies  Reviewed medications and allergies and updated as indicated    PHYSICAL EXAM:    Blood pressure 128/80, height 5' 1\" (1.549 m), weight 69.4 kg (153 lb). Body mass index is 28.91 kg/m².  General Appearance: NAD, cooperative, alert  Eyes: Anicteric, conjunctiva pink  ENT:  Normocephalic, atraumatic, normal mucosa.    Neck:  Supple, symmetrical, trachea midline  Resp:  Clear to auscultation bilaterally; no rales, rhonchi or wheezing; respirations unlabored   CV:  S1 S2, Regular rate and rhythm; no murmur, rub, or gallop.  GI:  Soft, non-tender, non-distended; normal bowel sounds; no masses, no organomegaly   Rectal: Deferred  Musculoskeletal: No cyanosis, clubbing or edema. Normal ROM.  Skin:  No jaundice, rashes, or lesions   Heme/Lymph: No palpable cervical lymphadenopathy    Lab Results:   No results found for: \"WBC\", \"HGB\", \"HCT\", \"MCV\", \"PLT\"  Lab Results   Component Value Date    K 4.3 09/07/2023     09/07/2023    CO2 29 09/07/2023    BUN 15 09/07/2023    CREATININE 0.78 09/07/2023    CALCIUM 9.3 09/07/2023    AST 23 09/07/2023    ALT 35 09/07/2023    ALKPHOS 52 09/07/2023    EGFR 115 09/07/2023       Radiology Results:   No results found.    "

## 2024-07-16 NOTE — TELEPHONE ENCOUNTER
Scheduled date of combo (as of today):09/30/24  Physician performing combo:Dede  Location of combo:[facility]  Bowel prep reviewed with patient:Miralax  Instructions reviewed with patient by:DS - Copy went wotj Albin as well as a copy with the family  Clearances: NONE

## 2024-07-16 NOTE — TELEPHONE ENCOUNTER
Rosetta from [facility] called. States pantoprazole on med list of AVS. They do not have pt on that med. Per chart review, last Rx 10/26/22 (not dispensed) and marked as not taking. They will need order/note faxed stating pt is not on it.      She is also requesting Rx for Miralax and Dulcolax be sent to [facility].    Rosetta [phone] ext [phone]   Fax [phone]

## 2024-07-16 NOTE — PROGRESS NOTES
[facility] - Outpatient Follow-up Note  Kit Morales 43 y.o. male MRN: [id number]  Encounter: [id number]    ASSESSMENT AND PLAN:      1. Esophageal dysphagia  - EGD; Future  2. Gastroesophageal reflux disease, unspecified whether esophagitis present  43-year-old gentleman with history of Down syndrome and GERD previously presented with dysphagia and found to have mild intrinsic benign stricture consistent with reflux.  Benefited from dilatation in past.  By history may be having intermittent episodes again.  Denies any significant heartburn or dyspepsia.  No signs of bleeding.  Weight loss appears to be diet related.  Reflux diet and precautions  Cut and chew food well  Take time eating  Plan EGD at the same time as colonoscopy  Hold on acid suppressive therapy unless signs of inflammation on EGD  - EGD; Future    3. Family history of colonic polyps  Family history of colon polyps and colorectal cancer in 2 generations.  Colonoscopy recommended at age 42 during previous visit, but due to multiple factors procedure was delayed.  Will schedule now and combine upper and lower procedures.  Colonoscopy; Future  polyethylene glycol (MiraLax) 17 GM/SCOOP powder; Take 238 g by mouth once for 1 dose Take 238 g my mouth. Use as directed  Dispense: 238 g; Refill: 0      Followup Appointment: 1 year or sooner if needed  ______________________________________________________________________    Chief Complaint   Patient presents with    Follow-up     Pt has narrowing of esophagus     HPI:  Overall feeling well.  Lives in residential home.  Had EGD several years ago for dysphagia with stricture.  Did better after dilatation.  Appetie good.  Staff at group home are concerned he may be having difficulty swallowing again.  Frequency is intermittent and not clear how often.  This pain or dyspepsia.  Now getting restricted diet and losing a little bit of weight, discussed benefits of this for GERD.   "Denies any melena or hematochezia.  Has a formed bowel movement daily.    Historical Information   Past Medical History:   Diagnosis Date    Disease of thyroid gland     Down's syndrome     Dysphagia     GERD (gastroesophageal reflux disease)     Hypotonia      Past Surgical History:   Procedure Laterality Date    HERNIA REPAIR      [place] ESOPHAGOGASTRODUODENOSCOPY TRANSORAL DIAGNOSTIC N/A 11/21/2018    Procedure: ESOPHAGOGASTRODUODENOSCOPY (EGD);  Surgeon: Khushboo Aguayo MD;  Location: [facility] OR;  Service: Gastroenterology    UPPER GASTROINTESTINAL ENDOSCOPY      June 2013 mild stricture EG junction.  Biopsies showed inflammation but no Freeman's, EOE or neoplasm.     Social History     Substance and Sexual Activity   Alcohol Use Yes     Social History     Substance and Sexual Activity   Drug Use No     Social History     Tobacco Use   Smoking Status Never   Smokeless Tobacco Never     Family History   Problem Relation Age of Onset    Heart disease Mother     Cancer Mother     Colon polyps Mother     Colon cancer Maternal Grandfather          Current Outpatient Medications:     allopurinol (ZYLOPRIM) 100 mg tablet    levothyroxine 50 mcg tablet    Levothyroxine Sodium 50 MCG CAPS    metroNIDAZOLE (METROCREAM) 0.75 % cream    polyethylene glycol (MiraLax) 17 GM/SCOOP powder    pantoprazole (PROTONIX) 20 mg tablet  No Known Allergies  Reviewed medications and allergies and updated as indicated    PHYSICAL EXAM:    Blood pressure 128/80, height 5' 1\" (1.549 m), weight 69.4 kg (153 lb). Body mass index is 28.91 kg/m².  General Appearance: NAD, cooperative, alert  Eyes: Anicteric, conjunctiva pink  ENT:  Normocephalic, atraumatic, normal mucosa.    Neck:  Supple, symmetrical, trachea midline  Resp:  Clear to auscultation bilaterally; no rales, rhonchi or wheezing; respirations unlabored   CV:  S1 S2, Regular rate and rhythm; no murmur, rub, or gallop.  GI:  Soft, non-tender, non-distended; normal bowel sounds; no masses, " "no organomegaly   Rectal: Deferred  Musculoskeletal: No cyanosis, clubbing or edema. Normal ROM.  Skin:  No jaundice, rashes, or lesions   Heme/Lymph: No palpable cervical lymphadenopathy    Lab Results:   No results found for: \"WBC\", \"HGB\", \"HCT\", \"MCV\", \"PLT\"  Lab Results   Component Value Date    K 4.3 09/07/2023     09/07/2023    CO2 29 09/07/2023    BUN 15 09/07/2023    CREATININE 0.78 09/07/2023    CALCIUM 9.3 09/07/2023    AST 23 09/07/2023    ALT 35 09/07/2023    ALKPHOS 52 09/07/2023    EGFR 115 09/07/2023       Radiology Results:   No results found.    "

## 2024-07-19 NOTE — TELEPHONE ENCOUNTER
Spoke to Rosetta at [facility].  Patient had not been on pantoprazole prior to coming to [facility] (confirmed with family).  Med list has patient on pantoprazole.  If he is to get the med, rx needs to be sent to [facility] and we need to let them know.  If not to take, we need to communicate to them in writing at some point.    Regard the miralax/ducolax prescriptions - that is for the prep for the colonoscopy in September.  Family wants the scripts [facility] a [facility].  We need to call Duyen Morales (mom I believe) to verify the correct [facility].  The medical assistants will work on the prep.

## 2024-07-24 ENCOUNTER — TELEPHONE (OUTPATIENT)
Age: 44
End: 2024-07-24

## 2024-07-24 NOTE — TELEPHONE ENCOUNTER
Patients GI provider:  Dr. Aguayo    Number to return call: [phone]    Reason for call: Pt's sister Catalina called in regard to Dr Aguayo's message and asking if EGD is needed.    Scheduled procedure/appointment date if applicable:  9/30/2024

## 2024-07-24 NOTE — TELEPHONE ENCOUNTER
Spoke with pts sister Catalina, she was calling back from Dr. Aguayo's VM that was left to her mother. There was some confusion if EGD was still being done. I explained the VM was in regards to the pantoprazole medication and if pt was still taking it.     Catalina reports pt is off pantoprazole and has not had any symptoms since February. I advised I would relay message to provider and we will not reorder medication. She understood.

## 2024-07-31 DIAGNOSIS — Z12.11 SCREENING FOR COLON CANCER: Primary | ICD-10-CM

## 2024-08-02 ENCOUNTER — TELEPHONE (OUTPATIENT)
Age: 44
End: 2024-08-02

## 2024-08-02 RX ORDER — BISACODYL 5 MG/1
5 TABLET, DELAYED RELEASE ORAL ONCE AS NEEDED
Qty: 4 TABLET | Refills: 0 | Status: SHIPPED | OUTPATIENT
Start: 2024-08-02

## 2024-08-02 NOTE — TELEPHONE ENCOUNTER
MAREK FROM  [facility] CALLED IN TO SCHEDULE A ONE YR F/U WITH DR. WANG AT THE [facility]. INFORMED MAREK THE SCHEDULE IS NOT OUT YET AND IF SHE CAN CALL BACK IN THE FALL.

## 2024-09-16 ENCOUNTER — TELEPHONE (OUTPATIENT)
Dept: GASTROENTEROLOGY | Facility: CLINIC | Age: 44
End: 2024-09-16

## 2024-09-16 NOTE — TELEPHONE ENCOUNTER
Procedure confirmed  Colonoscopy/Endoscopy     Via: Spoke with patient.      Instructions given: Given to Patient at Visit  [place]    Prep Given: Miralax/Dulcolax    Call the office if there are any questions.

## 2024-09-30 ENCOUNTER — ANESTHESIA (OUTPATIENT)
Dept: GASTROENTEROLOGY | Facility: HOSPITAL | Age: 44
End: 2024-09-30
Payer: MEDICARE

## 2024-09-30 ENCOUNTER — ANESTHESIA EVENT (OUTPATIENT)
Dept: GASTROENTEROLOGY | Facility: HOSPITAL | Age: 44
End: 2024-09-30
Payer: MEDICARE

## 2024-09-30 ENCOUNTER — HOSPITAL ENCOUNTER (OUTPATIENT)
Dept: GASTROENTEROLOGY | Facility: HOSPITAL | Age: 44
Setting detail: OUTPATIENT SURGERY
Discharge: HOME | End: 2024-09-30
Attending: INTERNAL MEDICINE
Payer: MEDICARE

## 2024-09-30 VITALS
RESPIRATION RATE: 12 BRPM | OXYGEN SATURATION: 96 % | HEIGHT: 61 IN | HEART RATE: 83 BPM | DIASTOLIC BLOOD PRESSURE: 46 MMHG | SYSTOLIC BLOOD PRESSURE: 81 MMHG | BODY MASS INDEX: 28.32 KG/M2 | TEMPERATURE: 98 F | WEIGHT: 150 LBS

## 2024-09-30 DIAGNOSIS — Z83.719 FAMILY HISTORY OF COLONIC POLYPS: ICD-10-CM

## 2024-09-30 DIAGNOSIS — K22.2 GERD WITH STRICTURE: ICD-10-CM

## 2024-09-30 DIAGNOSIS — R13.19 ESOPHAGEAL DYSPHAGIA: ICD-10-CM

## 2024-09-30 DIAGNOSIS — K21.9 GASTROESOPHAGEAL REFLUX DISEASE, UNSPECIFIED WHETHER ESOPHAGITIS PRESENT: ICD-10-CM

## 2024-09-30 DIAGNOSIS — K21.9 GERD WITH STRICTURE: ICD-10-CM

## 2024-09-30 PROBLEM — S03.00XA TMJ (DISLOCATION OF TEMPOROMANDIBULAR JOINT): Status: ACTIVE | Noted: 2024-09-30

## 2024-09-30 PROCEDURE — 43249 ESOPH EGD DILATION <30 MM: CPT | Performed by: INTERNAL MEDICINE

## 2024-09-30 PROCEDURE — 43239 EGD BIOPSY SINGLE/MULTIPLE: CPT | Performed by: INTERNAL MEDICINE

## 2024-09-30 PROCEDURE — 88305 TISSUE EXAM BY PATHOLOGIST: CPT | Performed by: STUDENT IN AN ORGANIZED HEALTH CARE EDUCATION/TRAINING PROGRAM

## 2024-09-30 PROCEDURE — C1726 CATH, BAL DIL, NON-VASCULAR: HCPCS

## 2024-09-30 PROCEDURE — G0105 COLORECTAL SCRN; HI RISK IND: HCPCS | Performed by: INTERNAL MEDICINE

## 2024-09-30 RX ORDER — EPHEDRINE SULFATE 50 MG/ML
INJECTION INTRAVENOUS AS NEEDED
Status: DISCONTINUED | OUTPATIENT
Start: 2024-09-30 | End: 2024-09-30

## 2024-09-30 RX ORDER — PROPOFOL 10 MG/ML
INJECTION, EMULSION INTRAVENOUS AS NEEDED
Status: DISCONTINUED | OUTPATIENT
Start: 2024-09-30 | End: 2024-09-30

## 2024-09-30 RX ORDER — PANTOPRAZOLE SODIUM 20 MG/1
20 TABLET, DELAYED RELEASE ORAL DAILY
Qty: 14 TABLET | Refills: 0 | Status: SHIPPED | OUTPATIENT
Start: 2024-09-30

## 2024-09-30 RX ORDER — LIDOCAINE HYDROCHLORIDE 10 MG/ML
INJECTION, SOLUTION EPIDURAL; INFILTRATION; INTRACAUDAL; PERINEURAL AS NEEDED
Status: DISCONTINUED | OUTPATIENT
Start: 2024-09-30 | End: 2024-09-30

## 2024-09-30 RX ORDER — GLYCOPYRROLATE 0.2 MG/ML
INJECTION INTRAMUSCULAR; INTRAVENOUS AS NEEDED
Status: DISCONTINUED | OUTPATIENT
Start: 2024-09-30 | End: 2024-09-30

## 2024-09-30 RX ORDER — SODIUM CHLORIDE 9 MG/ML
INJECTION, SOLUTION INTRAVENOUS CONTINUOUS PRN
Status: DISCONTINUED | OUTPATIENT
Start: 2024-09-30 | End: 2024-09-30

## 2024-09-30 RX ADMIN — PROPOFOL 40 MG: 10 INJECTION, EMULSION INTRAVENOUS at 10:59

## 2024-09-30 RX ADMIN — GLYCOPYRROLATE 0.1 MG: 0.2 INJECTION INTRAMUSCULAR; INTRAVENOUS at 10:19

## 2024-09-30 RX ADMIN — PROPOFOL 40 MG: 10 INJECTION, EMULSION INTRAVENOUS at 10:49

## 2024-09-30 RX ADMIN — PROPOFOL 40 MG: 10 INJECTION, EMULSION INTRAVENOUS at 10:53

## 2024-09-30 RX ADMIN — EPHEDRINE SULFATE 5 MG: 50 INJECTION INTRAVENOUS at 10:32

## 2024-09-30 RX ADMIN — PROPOFOL 50 MG: 10 INJECTION, EMULSION INTRAVENOUS at 10:37

## 2024-09-30 RX ADMIN — PROPOFOL 100 MG: 10 INJECTION, EMULSION INTRAVENOUS at 10:19

## 2024-09-30 RX ADMIN — PROPOFOL 50 MG: 10 INJECTION, EMULSION INTRAVENOUS at 10:30

## 2024-09-30 RX ADMIN — SODIUM CHLORIDE: 0.9 INJECTION, SOLUTION INTRAVENOUS at 10:39

## 2024-09-30 RX ADMIN — PROPOFOL 50 MG: 10 INJECTION, EMULSION INTRAVENOUS at 10:25

## 2024-09-30 RX ADMIN — PROPOFOL 40 MG: 10 INJECTION, EMULSION INTRAVENOUS at 10:42

## 2024-09-30 RX ADMIN — LIDOCAINE HYDROCHLORIDE 70 MG: 10 INJECTION, SOLUTION EPIDURAL; INFILTRATION; INTRACAUDAL; PERINEURAL at 10:19

## 2024-09-30 RX ADMIN — EPHEDRINE SULFATE 10 MG: 50 INJECTION INTRAVENOUS at 10:26

## 2024-09-30 RX ADMIN — PROPOFOL 50 MG: 10 INJECTION, EMULSION INTRAVENOUS at 10:23

## 2024-09-30 RX ADMIN — PROPOFOL 40 MG: 10 INJECTION, EMULSION INTRAVENOUS at 10:46

## 2024-09-30 RX ADMIN — SODIUM CHLORIDE: 0.9 INJECTION, SOLUTION INTRAVENOUS at 10:13

## 2024-09-30 NOTE — ANESTHESIA POSTPROCEDURE EVALUATION
Post-Op Assessment Note    CV Status:  Stable  Pain Score: 0    Pain management: adequate       Mental Status:  Sleepy   Hydration Status:  Stable   PONV Controlled:  None   Airway Patency:  Patent     Post Op Vitals Reviewed: Yes    No anethesia notable event occurred.    Staff: CRNA, Anesthesiologist           BP   85/47 (59)   Temp      Pulse   94   Resp   14   SpO2   95% on RA   
4 = No assist / stand by assistance

## 2024-09-30 NOTE — ANESTHESIA PREPROCEDURE EVALUATION
Procedure:  COLONOSCOPY  EGD    Relevant Problems   ANESTHESIA (within normal limits)      CARDIO (within normal limits)  no hx cardiac complications      GI/HEPATIC   (+) Dysphagia (Prior esophageal dilations)   (+) Gastroesophageal reflux disease      PULMONARY (within normal limits)   (-) Sleep apnea   (-) Smoking   (-) URI (upper respiratory infection)      Endocrine   (+) Disease of thyroid gland      Genetics   (+) Down's syndrome      Physical Exam    Airway  Comment: Large neck  Mallampati score: III  TM Distance: <3 FB  Neck ROM: limited     Dental   No notable dental hx     Cardiovascular      Pulmonary      Other Findings      Lab Results   Component Value Date    SODIUM 142 09/07/2023    K 4.3 09/07/2023    BUN 15 09/07/2023    CREATININE 0.78 09/07/2023    EGFR 115 09/07/2023     Anesthesia Plan  ASA Score- 3     Anesthesia Type- IV sedation with anesthesia with ASA Monitors.         Additional Monitors:     Airway Plan:            Plan Factors-Exercise tolerance (METS): >4 METS.    Chart reviewed.  Imaging results reviewed. Existing labs reviewed. Patient summary reviewed.    Patient is not a current smoker.              Induction- intravenous.    Postoperative Plan-         Informed Consent- Anesthetic plan and risks discussed with patient, sibling and mother.  I personally reviewed this patient with the CRNA. Discussed and agreed on the Anesthesia Plan with the CRNA..

## 2024-09-30 NOTE — H&P
"History and Physical -  [facility]  Kit Morales 44 y.o. male MRN: [id number]    HPI: Kit Morales is a 44 y.o. year old male who presents for EGD for evaluation of dysphagia and history of esophageal stricture.  Colonoscopy for family history of polyps.    REVIEW OF SYSTEMS: Per the HPI, and otherwise unremarkable.    Historical Information   Past Medical History:   Diagnosis Date    Disease of thyroid gland     Down's syndrome     Dysphagia     GERD (gastroesophageal reflux disease)     Hypotonia      Past Surgical History:   Procedure Laterality Date    HERNIA REPAIR      UT ESOPHAGOGASTRODUODENOSCOPY TRANSORAL DIAGNOSTIC N/A 11/21/2018    Procedure: ESOPHAGOGASTRODUODENOSCOPY (EGD);  Surgeon: Khushboo Aguayo MD;  Location:  MAIN OR;  Service: Gastroenterology    UPPER GASTROINTESTINAL ENDOSCOPY      June 2013 mild stricture EG junction.  Biopsies showed inflammation but no Freeman's, EOE or neoplasm.     Social History   Social History     Substance and Sexual Activity   Alcohol Use Yes     Social History     Substance and Sexual Activity   Drug Use No     Social History     Tobacco Use   Smoking Status Never   Smokeless Tobacco Never     Family History   Problem Relation Age of Onset    Heart disease Mother     Cancer Mother     Colon polyps Mother     Colon cancer Maternal Grandfather        Meds/Allergies       Current Outpatient Medications:     allopurinol (ZYLOPRIM) 100 mg tablet    levothyroxine 50 mcg tablet    bisacodyl (DULCOLAX) 5 mg EC tablet    Levothyroxine Sodium 50 MCG CAPS    metroNIDAZOLE (METROCREAM) 0.75 % cream    pantoprazole (PROTONIX) 20 mg tablet    polyethylene glycol (MiraLax) 17 GM/SCOOP powder    No Known Allergies    Objective     /72   Pulse 72   Temp 99.2 °F (37.3 °C) (Temporal)   Resp 16   Ht 5' 1\" (1.549 m)   Wt 68 kg (150 lb)   SpO2 99%   BMI 28.34 kg/m²     PHYSICAL EXAM    Gen: NAD AAOx3  Head: Normocephalic, Atraumatic  CV: S1S2 RRR no " m/r/g  CHEST: Clear b/l no c/r/w  ABD: soft, +BS NT/ND  EXT: no edema    ASSESSMENT/PLAN:  This is a 44 y.o. year old male here for EGD and colonoscopy, and he is stable and optimized for his procedure.

## 2024-10-01 ENCOUNTER — TELEPHONE (OUTPATIENT)
Dept: GASTROENTEROLOGY | Facility: CLINIC | Age: 44
End: 2024-10-01

## 2024-10-01 NOTE — TELEPHONE ENCOUNTER
[facility] called to find out how many times pt's procedure was scheduled and then r/s prior to it being completed.

## 2024-10-03 ENCOUNTER — TELEPHONE (OUTPATIENT)
Dept: GASTROENTEROLOGY | Facility: CLINIC | Age: 44
End: 2024-10-03

## 2024-10-03 PROCEDURE — 88305 TISSUE EXAM BY PATHOLOGIST: CPT | Performed by: STUDENT IN AN ORGANIZED HEALTH CARE EDUCATION/TRAINING PROGRAM

## 2024-10-03 NOTE — TELEPHONE ENCOUNTER
Faxed EGD/Colonoscopy reports to John at [facility] [phone] per faxed request.

## 2024-10-08 NOTE — RESULT ENCOUNTER NOTE
Please tell patient/family: Kit Emanuelphuc    The biopsies obtained during your endoscopy were negative/normal.  There is no evidence of infection or any precancerous changes.  There is no change in the recommendations and follow-up as planned.    No recall EGD

## 2025-03-25 ENCOUNTER — TELEPHONE (OUTPATIENT)
Age: 45
End: 2025-03-25

## 2025-03-25 NOTE — TELEPHONE ENCOUNTER
Mother calling to say that we do not need to refill pantoprazole script.  Patient is doing well.

## 2025-07-01 ENCOUNTER — OFFICE VISIT (OUTPATIENT)
Dept: GASTROENTEROLOGY | Facility: CLINIC | Age: 45
End: 2025-07-01
Payer: MEDICARE

## 2025-07-01 VITALS
DIASTOLIC BLOOD PRESSURE: 68 MMHG | SYSTOLIC BLOOD PRESSURE: 110 MMHG | HEIGHT: 61 IN | WEIGHT: 133.4 LBS | BODY MASS INDEX: 25.19 KG/M2

## 2025-07-01 DIAGNOSIS — R13.19 ESOPHAGEAL DYSPHAGIA: ICD-10-CM

## 2025-07-01 DIAGNOSIS — K21.9 GASTROESOPHAGEAL REFLUX DISEASE, UNSPECIFIED WHETHER ESOPHAGITIS PRESENT: Primary | ICD-10-CM

## 2025-07-01 DIAGNOSIS — Z83.719 FAMILY HISTORY OF COLONIC POLYPS: ICD-10-CM

## 2025-07-01 PROCEDURE — 99213 OFFICE O/P EST LOW 20 MIN: CPT | Performed by: INTERNAL MEDICINE

## 2025-07-01 NOTE — ASSESSMENT & PLAN NOTE
Clinically stable with dietary management alone.  Intentionally lost some weight which is also helped.  No dysphagia since EGD with dilatation last year for benign stricture.  Was also able to stop PPI.  Continue reflux diet and precautions.  Cut and chew food well, take time eating.  Call if any swallowing or indigestion symptoms return.

## 2025-07-01 NOTE — PATIENT INSTRUCTIONS
Continue reflux diet and precautions.  Cut and chew food well, take time eating.  Call if any swallowing or indigestion symptoms return.

## 2025-07-01 NOTE — PROGRESS NOTES
"Name: Kit Morales      : 1980      MRN: [id number]  Encounter Provider: Khushboo Aguayo MD  Encounter Date: 2025   Encounter department: [facility]  :  Assessment & Plan  Gastroesophageal reflux disease, unspecified whether esophagitis present  Clinically stable with dietary management alone.  Intentionally lost some weight which is also helped.  No dysphagia since EGD with dilatation last year for benign stricture.  Was also able to stop PPI.  Continue reflux diet and precautions.  Cut and chew food well, take time eating.  Call if any swallowing or indigestion symptoms return.       Esophageal dysphagia  Resolved, see above       Family history of colonic polyps  Negative colonoscopy last year.  5-year follow-up recommended.  Next colonoscopy due 2029           History of Present Illness   Kit Morales is a 44 y.o. male who presents for yearly follow-up of reflux.  HPI  History obtained from: patient and patient's POA  No significant GERD or dyspepsia.  No dysphagia.  Had benign stricture on EGD last year that was dilated.  No problems since.  Was able to stop PPI.  Intentionally lost some weight.  Stools normal.  Up-to-date with colorectal cancer screening given family history of polyps.  Review of Systems A complete review of systems is negative other than that noted above in the HPI.      Current Medications[1]  Objective   /68   Ht 5' 1\" (1.549 m)   Wt 60.5 kg (133 lb 6.4 oz)   BMI 25.21 kg/m²     Physical Exam General Appearance: Down's facies, NAD, cooperative, alert  Eyes: Anicteric, conjunctiva pink  ENT:  Normocephalic, atraumatic, normal mucosa.    Neck:  Supple, symmetrical, trachea midline  Resp:  Clear to auscultation bilaterally; no rales, rhonchi or wheezing; respirations unlabored   CV:  S1 S2, Regular rate and rhythm; no murmur, rub, or gallop.  GI:  Soft, non-tender, non-distended; normal bowel sounds; no masses, no organomegaly "   Rectal: Deferred  Musculoskeletal: No cyanosis, clubbing or edema. Normal ROM.  Skin:  No jaundice, rashes, or lesions   Heme/Lymph: No palpable cervical lymphadenopathy  Psych: Normal affect, good eye contact      Lab Results: I personally reviewed relevant lab results.       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      RECOMMENDATION:  Reflux diet and precautions  Cut and chew food well, take time eating  Pantoprazole 20 mg daily for 2 weeks, then discontinue  GI office follow-up 1 year or sooner if needed        Khushboo Aguayo MD             [1]   Current Outpatient Medications   Medication Sig Dispense Refill    allopurinol (ZYLOPRIM) 100 mg tablet       levothyroxine 50 mcg tablet       bisacodyl (DULCOLAX) 5 mg EC tablet Take 1 tablet (5 mg total) by mouth once as needed (Procedure prep) for up to 4 doses (Patient not taking: Reported on 7/1/2025) 4 tablet 0    Levothyroxine Sodium 50 MCG CAPS Take 50 mcg by mouth daily (Patient not taking: Reported on 7/1/2025)      metroNIDAZOLE (METROCREAM) 0.75 % cream  (Patient not taking: Reported on 7/1/2025)      pantoprazole (PROTONIX) 20 mg tablet Take 1 tablet (20 mg total) by mouth daily (Patient not taking: Reported on 7/1/2025) 14 tablet 0    polyethylene glycol (MiraLax) 17 GM/SCOOP powder Take 238 g by mouth once for 1 dose Take 238 g my mouth. Use as directed 238 g 0     No current facility-administered medications for this visit.

## 2025-07-01 NOTE — ASSESSMENT & PLAN NOTE
Negative colonoscopy last year.  5-year follow-up recommended.  Next colonoscopy due July 2029

## 2025-07-01 NOTE — LETTER
2025     Marcus Stockton MD  [address] [facility] [address]    Patient: Kit Morales   YOB: 1980   Date of Visit: 2025       Dear Dr. Marcus Stockton MD:    Thank you for referring Kit Morales to me for evaluation. Below are my notes for this consultation.    If you have questions, please do not hesitate to call me. I look forward to following your patient along with you.         Sincerely,        Khushboo Aguayo MD        CC: No Recipients    Khushboo Aguayo MD  2025 11:01 AM  Sign when Signing Visit  Name: Kit Morales      : 1980      MRN: [id number]  Encounter Provider: Khushboo Aguayo MD  Encounter Date: 2025   Encounter department: [facility]  :  Assessment & Plan  Gastroesophageal reflux disease, unspecified whether esophagitis present  Clinically stable with dietary management alone.  Intentionally lost some weight which is also helped.  No dysphagia since EGD with dilatation last year for benign stricture.  Was also able to stop PPI.  Continue reflux diet and precautions.  Cut and chew food well, take time eating.  Call if any swallowing or indigestion symptoms return.       Esophageal dysphagia  Resolved, see above       Family history of colonic polyps  Negative colonoscopy last year.  5-year follow-up recommended.  Next colonoscopy due 2029           History of Present Illness  Kit Morales is a 44 y.o. male who presents for yearly follow-up of reflux.  HPI  History obtained from: patient and patient's POA  No significant GERD or dyspepsia.  No dysphagia.  Had benign stricture on EGD last year that was dilated.  No problems since.  Was able to stop PPI.  Intentionally lost some weight.  Stools normal.  Up-to-date with colorectal cancer screening given family history of polyps.  Review of Systems A complete review of systems is negative other than that noted above in the HPI.      Current Medications[1]  Objective  /68   Ht  "5' 1\" (1.549 m)   Wt 60.5 kg (133 lb 6.4 oz)   BMI 25.21 kg/m²     Physical Exam General Appearance: Down's facies, NAD, cooperative, alert  Eyes: Anicteric, conjunctiva pink  ENT:  Normocephalic, atraumatic, normal mucosa.    Neck:  Supple, symmetrical, trachea midline  Resp:  Clear to auscultation bilaterally; no rales, rhonchi or wheezing; respirations unlabored   CV:  S1 S2, Regular rate and rhythm; no murmur, rub, or gallop.  GI:  Soft, non-tender, non-distended; normal bowel sounds; no masses, no organomegaly   Rectal: Deferred  Musculoskeletal: No cyanosis, clubbing or edema. Normal ROM.  Skin:  No jaundice, rashes, or lesions   Heme/Lymph: No palpable cervical lymphadenopathy  Psych: Normal affect, good eye contact      Lab Results: I personally reviewed relevant lab results.       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      RECOMMENDATION:  Reflux diet and precautions  Cut and chew food well, take time eating  Pantoprazole 20 mg daily for 2 weeks, then discontinue  GI office follow-up 1 year or sooner if needed        Khushboo Aguayo MD             [1]   Current Outpatient Medications   Medication Sig Dispense Refill   • allopurinol (ZYLOPRIM) 100 mg tablet      • levothyroxine 50 mcg tablet      • bisacodyl (DULCOLAX) 5 mg EC tablet Take 1 tablet (5 mg total) by mouth once as needed (Procedure prep) for up to 4 doses (Patient not taking: Reported on 7/1/2025) 4 tablet 0   • Levothyroxine Sodium 50 MCG CAPS Take 50 mcg by mouth daily (Patient not taking: Reported on 7/1/2025)     • metroNIDAZOLE (METROCREAM) 0.75 % cream  (Patient not taking: Reported on 7/1/2025)     • pantoprazole (PROTONIX) 20 mg tablet Take 1 tablet (20 mg total) by mouth daily (Patient not taking: Reported on 7/1/2025) 14 tablet 0   • " polyethylene glycol (MiraLax) 17 GM/SCOOP powder Take 238 g by mouth once for 1 dose Take 238 g my mouth. Use as directed 238 g 0     No current facility-administered medications for this visit.

## (undated) DEVICE — TUBING SUCTION 5MM X 12 FT

## (undated) DEVICE — SYRINGE 30ML LL

## (undated) DEVICE — 1200CC GUARDIAN II: Brand: GUARDIAN

## (undated) DEVICE — SYRINGE 50ML LL

## (undated) DEVICE — SINGLE-USE BIOPSY FORCEPS: Brand: RADIAL JAW 4